# Patient Record
Sex: FEMALE | Race: ASIAN | NOT HISPANIC OR LATINO
[De-identification: names, ages, dates, MRNs, and addresses within clinical notes are randomized per-mention and may not be internally consistent; named-entity substitution may affect disease eponyms.]

---

## 2023-07-01 ENCOUNTER — APPOINTMENT (OUTPATIENT)
Dept: ORTHOPEDIC SURGERY | Facility: CLINIC | Age: 73
End: 2023-07-01
Payer: MEDICARE

## 2023-07-01 VITALS — BODY MASS INDEX: 22.78 KG/M2 | HEIGHT: 60 IN | WEIGHT: 116 LBS

## 2023-07-01 DIAGNOSIS — I10 ESSENTIAL (PRIMARY) HYPERTENSION: ICD-10-CM

## 2023-07-01 PROBLEM — Z00.00 ENCOUNTER FOR PREVENTIVE HEALTH EXAMINATION: Status: ACTIVE | Noted: 2023-07-01

## 2023-07-01 PROCEDURE — 99203 OFFICE O/P NEW LOW 30 MIN: CPT | Mod: 25

## 2023-07-01 PROCEDURE — J3490M: CUSTOM | Mod: NC

## 2023-07-01 PROCEDURE — 73562 X-RAY EXAM OF KNEE 3: CPT | Mod: RT

## 2023-07-01 PROCEDURE — 20610 DRAIN/INJ JOINT/BURSA W/O US: CPT | Mod: RT

## 2023-07-01 NOTE — PROCEDURE
[FreeTextEntry3] : Injection Procedure Note:\par  \par Patient Identification\par Name/: Verbal with patient and/or family\par  \par Procedure Verification:\par Procedure confirmed with patient or family/designee\par Consent for procedure: Verbal Consent Given\par Relevant documentation completed, reviewed, and signed\par Clinical indications for procedure confirmed\par \par  \par \par Time-out with all members of procedure team immediately prior to procedure:\par Correct patient identified. Agreement on procedure. Correct side and site.\par \par  \par \par KNEE INJECTION (STEROID) - RIGHT\par After verbal consent and identification of the correct patient and correct site, the superolateral right knee was prepped using alcohol swabs and betadine. This was allowed time to air dry. A mixture of 1cc DepoMedrol 40mg/ml, 3cc Lidocaine 1%, and 3cc Bupivacaine 0.5% was injected into the suprapatellar pouch using a sterile 22G needle after ethyl chloride spray for skin anesthesia. The patient tolerated the procedure well. After-care instructions were provided and included instructions to ice the area and to call if redness, pain, or fever develop.\par

## 2023-07-01 NOTE — ASSESSMENT
[FreeTextEntry1] : ASp/CSI tolerated well\par Ice rest nsaids\par FU 1-2 weeks after vacation with [place]

## 2023-07-01 NOTE — HISTORY OF PRESENT ILLNESS
[6] : 6 [Dull/Aching] : dull/aching [Radiating] : radiating [Constant] : constant [Standing] : standing [Walking] : walking [Stairs] : stairs [Retired] : Work status: retired [de-identified] : R knee pain x 1 week. No specific injury. She cares for her grandkids during the week. She has swelling. No prior R knee issues.  [] : Post Surgical Visit: no [FreeTextEntry1] : RT knee [FreeTextEntry5] : Patient is here with knee pain since 3 weeks ago, pain sometimes travels up the leg to the buttock, had a CSI in her Rt foot for her bunion 1 month ago

## 2023-07-01 NOTE — IMAGING
[de-identified] : \par RIGHT KNEE\par Inspection:  mild effusion\par Palpation: medial joint line tenderness, anterior tenderness\par Knee Range of Motion:  3-125 \par Strength: 5/5 Quadriceps strength, 5/5 Hamstring strength\par Neurological: light touch is intact throughout\par Ligament Stability and Special Tests: \par McMurrays: neg\par Lachman: neg\par Pivot Shift: neg\par Posterior Drawer: neg\par Valgus: neg\par Varus: neg\par Patella Apprehension: neg\par Patella Maltracking: neg\par \par  [AP] : anteroposterior [Right] : right knee [Lateral] : lateral [[place]] : [place] [Degenerative change] : Degenerative change [FreeTextEntry9] : enchondroma R femur

## 2023-07-20 ENCOUNTER — APPOINTMENT (OUTPATIENT)
Dept: ORTHOPEDIC SURGERY | Facility: CLINIC | Age: 73
End: 2023-07-20
Payer: MEDICARE

## 2023-07-20 VITALS — HEIGHT: 60 IN | WEIGHT: 118 LBS | BODY MASS INDEX: 23.16 KG/M2

## 2023-07-20 PROCEDURE — 99214 OFFICE O/P EST MOD 30 MIN: CPT

## 2023-07-20 RX ORDER — MELOXICAM 15 MG/1
15 TABLET ORAL
Qty: 30 | Refills: 2 | Status: ACTIVE | COMMUNITY
Start: 2023-07-20 | End: 1900-01-01

## 2023-07-20 NOTE — IMAGING
[de-identified] : Constitutional: well developed and well nourished, able to communicate\par Cardiovascular: Peripheral vascular exam is grossly normal\par Neurologic: Alert and oriented, no acute distress.\par Skin: normal skin with no ulcers, rashes, or lesions\par Pulmonary: No respiratory distress, breathing comfortably on room air\par Lymphatics: No obvious lymphadenopathy or lymphedema in areas examined\par \par \par RIGHT KNEE EXAM\par Alignment: Neutral \par Effusion: pos\par Atrophy: None                                                 \par Stable to Varus/valgus stress\par Posterior Drawer Test: negative\par Anterior Drawer Test: Negative\par Knee Extension/Flexion: 0 / 120\par \par Medial/lateral compartments\par Medial joint line: No Tenderness\par Lateral joint line: No Tenderness\par Charles test: negative\par \par Patellofemoral joint\par Medial patellar facet: no tenderness\par Patellar grind: pos\par \par Tendons:\par Pes Anserine: No tenderness\par Gerdy’s Tubercle/ IT Band: No tenderness\par Quadriceps Tendon: No Tenderness\par patellar tendon: no Tenderness\par Tibial tubercle: not tenderness\par Calf: no Tenderness\par \par Neurovascular exam\par Muscle strength: 5/5\par Sensation to light touch: intact\par Distal pulses: 2+\par \par IMAGING:\par 07/20/2023 Xrays of the Right Knee were taken demonstrating moderate tricompartmental OA, severe PF OA\par

## 2023-07-20 NOTE — ASSESSMENT
[FreeTextEntry1] : 73 y/o F with R knee OA , minimal relief with CSI 2 weeks prior.\par will hold off gel injection for 2 weeks prior to gel injections\par RTO in 2 weeks - aspiration and orthovisc if no relief.

## 2023-07-20 NOTE — HISTORY OF PRESENT ILLNESS
[6] : 6 [Dull/Aching] : dull/aching [Radiating] : radiating [Constant] : constant [Standing] : standing [Walking] : walking [Stairs] : stairs [Retired] : Work status: retired [de-identified] : 07/20/2023 [facility] 72 year female that comes in today with a chief complaint of R knee pain x 2 months prior. Had CSI/asp at  with VIJAY Esqueda. small amount of relief with CSI [] : Post Surgical Visit: no [FreeTextEntry1] : RT knee

## 2023-08-03 ENCOUNTER — APPOINTMENT (OUTPATIENT)
Dept: ORTHOPEDIC SURGERY | Facility: CLINIC | Age: 73
End: 2023-08-03
Payer: MEDICARE

## 2023-08-03 VITALS — HEIGHT: 60 IN | BODY MASS INDEX: 23.16 KG/M2 | WEIGHT: 118 LBS

## 2023-08-03 PROCEDURE — 99214 OFFICE O/P EST MOD 30 MIN: CPT | Mod: 25

## 2023-08-03 PROCEDURE — 20610 DRAIN/INJ JOINT/BURSA W/O US: CPT | Mod: RT

## 2023-08-03 NOTE — ASSESSMENT
[FreeTextEntry1] : 73 y/o F with R knee OA , minimal relief with CSI 2 weeks prior. will hold off gel injection for 2 weeks prior to gel injections RTO in 2 weeks - aspiration and orthovisc if no relief.  8/3/23: Asp 10 cc and orthovisc injection #1 Follow up in one week for inj #2

## 2023-08-03 NOTE — PROCEDURE
[FreeTextEntry3] : The risks, benefits and alternatives to the injection were discussed with the patient. The decision was made to proceed with the injection to reduce inflammation within the area. Verbal consent was obtained for the procedure. The right knee was cleaned with alcohol and ethyl chloride was sprayed topically. Ultrasound was used to identify the suprapatellar bursa and visualized the location of the needle. Orthovisc 1 of 4 was injected. The patient tolerated the procedure well and was instructed to ice the affected joint as needed later today. Activities can be performed as tolerated.  Asp 10 cc straw colored fluid

## 2023-08-03 NOTE — HISTORY OF PRESENT ILLNESS
[Dull/Aching] : dull/aching [Radiating] : radiating [Constant] : constant [Leisure] : leisure [Sleep] : sleep [Meds] : meds [Heat] : heat [Sitting] : sitting [Walking] : walking [Stairs] : stairs [Lying in bed] : lying in bed [6] : 6 [Standing] : standing [Retired] : Work status: retired [de-identified] : 07/20/2023 [facility] is a 72 year female that comes in today with a chief complaint of R knee pain x 2 months prior. Had CSI/asp at  with VIJAY Esqueda. small amount of relief with CSI  08/03/2023 follow up/ right knee. Having some discomfort but states it has improved a bit.  [] : Post Surgical Visit: no [FreeTextEntry1] : RT knee [de-identified] : 07/20/23 [de-identified] : X-Ray [de-identified] : Patient uses heating pad, topical cream and Tylenol daily.

## 2023-08-03 NOTE — IMAGING
[de-identified] : Constitutional: well developed and well nourished, able to communicate\par  Cardiovascular: Peripheral vascular exam is grossly normal\par  Neurologic: Alert and oriented, no acute distress.\par  Skin: normal skin with no ulcers, rashes, or lesions\par  Pulmonary: No respiratory distress, breathing comfortably on room air\par  Lymphatics: No obvious lymphadenopathy or lymphedema in areas examined\par  \par  \par  RIGHT KNEE EXAM\par  Alignment: Neutral \par  Effusion: pos\par  Atrophy: None                                                 \par  Stable to Varus/valgus stress\par  Posterior Drawer Test: negative\par  Anterior Drawer Test: Negative\par  Knee Extension/Flexion: 0 / 120\par  \par  Medial/lateral compartments\par  Medial joint line: No Tenderness\par  Lateral joint line: No Tenderness\par  Charles test: negative\par  \par  Patellofemoral joint\par  Medial patellar facet: no tenderness\par  Patellar grind: pos\par  \par  Tendons:\par  Pes Anserine: No tenderness\par  Gerdy's Tubercle/ IT Band: No tenderness\par  Quadriceps Tendon: No Tenderness\par  patellar tendon: no Tenderness\par  Tibial tubercle: not tenderness\par  Calf: no Tenderness\par  \par  Neurovascular exam\par  Muscle strength: 5/5\par  Sensation to light touch: intact\par  Distal pulses: 2+\par  \par  IMAGING:\par  07/20/2023 Xrays of the Right Knee were taken demonstrating moderate tricompartmental OA, severe PF OA\par

## 2023-08-10 ENCOUNTER — APPOINTMENT (OUTPATIENT)
Dept: ORTHOPEDIC SURGERY | Facility: CLINIC | Age: 73
End: 2023-08-10
Payer: MEDICARE

## 2023-08-10 VITALS — HEIGHT: 60 IN | BODY MASS INDEX: 23.16 KG/M2 | WEIGHT: 118 LBS

## 2023-08-10 PROCEDURE — 99213 OFFICE O/P EST LOW 20 MIN: CPT | Mod: 25

## 2023-08-10 PROCEDURE — 20610 DRAIN/INJ JOINT/BURSA W/O US: CPT | Mod: RT

## 2023-08-10 RX ORDER — METHYLPREDNISOLONE 4 MG/1
4 TABLET ORAL
Qty: 1 | Refills: 0 | Status: ACTIVE | COMMUNITY
Start: 2023-08-10 | End: 1900-01-01

## 2023-08-10 NOTE — REVIEW OF SYSTEMS
[Joint Pain] : joint pain [Negative] : Heme/Lymph Clear bilaterally, pupils equal, round and reactive to light.

## 2023-08-10 NOTE — PROCEDURE
[FreeTextEntry3] : The risks, benefits and alternatives to the injection were discussed with the patient. The decision was made to proceed with the injection to reduce inflammation within the area. Verbal consent was obtained for the procedure. The right knee was cleaned with alcohol and ethyl chloride was sprayed topically. Ultrasound was used to identify the suprapatellar bursa and visualized the location of the needle. Orthovisc 2 of 4 was injected. The patient tolerated the procedure well and was instructed to ice the affected joint as needed later today. Activities can be performed as tolerated.  Asp 10 cc straw colored fluid

## 2023-08-10 NOTE — IMAGING
[de-identified] : Constitutional: well developed and well nourished, able to communicate\par  Cardiovascular: Peripheral vascular exam is grossly normal\par  Neurologic: Alert and oriented, no acute distress.\par  Skin: normal skin with no ulcers, rashes, or lesions\par  Pulmonary: No respiratory distress, breathing comfortably on room air\par  Lymphatics: No obvious lymphadenopathy or lymphedema in areas examined\par  \par  \par  RIGHT KNEE EXAM\par  Alignment: Neutral \par  Effusion: pos\par  Atrophy: None                                                 \par  Stable to Varus/valgus stress\par  Posterior Drawer Test: negative\par  Anterior Drawer Test: Negative\par  Knee Extension/Flexion: 0 / 120\par  \par  Medial/lateral compartments\par  Medial joint line: No Tenderness\par  Lateral joint line: No Tenderness\par  Charles test: negative\par  \par  Patellofemoral joint\par  Medial patellar facet: no tenderness\par  Patellar grind: pos\par  \par  Tendons:\par  Pes Anserine: No tenderness\par  Gerdy's Tubercle/ IT Band: No tenderness\par  Quadriceps Tendon: No Tenderness\par  patellar tendon: no Tenderness\par  Tibial tubercle: not tenderness\par  Calf: no Tenderness\par  \par  Neurovascular exam\par  Muscle strength: 5/5\par  Sensation to light touch: intact\par  Distal pulses: 2+\par  \par  IMAGING:\par  07/20/2023 Xrays of the Right Knee were taken demonstrating moderate tricompartmental OA, severe PF OA\par

## 2023-08-10 NOTE — HISTORY OF PRESENT ILLNESS
[6] : 6 [Dull/Aching] : dull/aching [Radiating] : radiating [Constant] : constant [Leisure] : leisure [Sleep] : sleep [Meds] : meds [Heat] : heat [Sitting] : sitting [Standing] : standing [Walking] : walking [Stairs] : stairs [Lying in bed] : lying in bed [Retired] : Work status: retired [2] : 2 [Orthovisc] : Orthovisc [] : Post Surgical Visit: no [FreeTextEntry1] : RT knee [de-identified] : 07/20/23 [de-identified] : X-Ray [de-identified] : Patient uses heating pad, topical cream and Tylenol daily. [de-identified] : 08/03/23 [de-identified] : Right Knee [de-identified] : Pain increased and slight swelling occured [TWNoteComboBox1] : 0%

## 2023-08-10 NOTE — ASSESSMENT
[FreeTextEntry1] : 71 y/o F with R knee OA , minimal relief with CSI 2 weeks prior. will hold off gel injection for 2 weeks prior to gel injections RTO in 2 weeks - aspiration and orthovisc if no relief.  8/3/23: Asp 10 cc and orthovisc injection #1 Follow up in one week for inj #2  8/10/23: Orthovisc inj #2 MDP  Follow up in one week for inj # 3

## 2023-08-17 ENCOUNTER — APPOINTMENT (OUTPATIENT)
Dept: ORTHOPEDIC SURGERY | Facility: CLINIC | Age: 73
End: 2023-08-17
Payer: MEDICARE

## 2023-08-17 VITALS — HEIGHT: 60 IN | BODY MASS INDEX: 23.16 KG/M2 | WEIGHT: 118 LBS

## 2023-08-17 PROCEDURE — 99213 OFFICE O/P EST LOW 20 MIN: CPT | Mod: 25

## 2023-08-17 PROCEDURE — 20610 DRAIN/INJ JOINT/BURSA W/O US: CPT | Mod: RT

## 2023-08-17 NOTE — HISTORY OF PRESENT ILLNESS
[6] : 6 [Dull/Aching] : dull/aching [Radiating] : radiating [Constant] : constant [Leisure] : leisure [Sleep] : sleep [Meds] : meds [Heat] : heat [Sitting] : sitting [Standing] : standing [Walking] : walking [Stairs] : stairs [Lying in bed] : lying in bed [Retired] : Work status: retired [3] : 3 [Orthovisc] : Orthovisc [] : Post Surgical Visit: no [FreeTextEntry1] : RT knee [de-identified] : 07/20/23 [de-identified] : X-Ray [de-identified] : Patient uses heating pad, topical cream and Tylenol daily. [de-identified] : 08/10/23 [de-identified] : Right Knee [de-identified] : Pain slightly decreased and slight swelling occured [TWNoteComboBox1] : 50%

## 2023-08-17 NOTE — PROCEDURE
[FreeTextEntry3] : The risks, benefits and alternatives to the injection were discussed with the patient. The decision was made to proceed with the injection to reduce inflammation within the area. Verbal consent was obtained for the procedure. The right knee was cleaned with alcohol and ethyl chloride was sprayed topically. Ultrasound was used to identify the suprapatellar bursa and visualized the location of the needle. Orthovisc 3 of 4 was injected. The patient tolerated the procedure well and was instructed to ice the affected joint as needed later today. Activities can be performed as tolerated.  Asp 10 cc straw colored fluid

## 2023-08-17 NOTE — ASSESSMENT
[FreeTextEntry1] : 71 y/o F with R knee OA , minimal relief with CSI 2 weeks prior. will hold off gel injection for 2 weeks prior to gel injections RTO in 2 weeks - aspiration and orthovisc if no relief.  8/3/23: Asp 10 cc and orthovisc injection #1 Follow up in one week for inj #2  8/10/23: Orthovisc inj #2 MDP  Follow up in one week for inj # 3  8/17/23: Orthovisc inj #3 Follow up in one week for inj #4

## 2023-08-17 NOTE — IMAGING
[de-identified] : Constitutional: well developed and well nourished, able to communicate\par  Cardiovascular: Peripheral vascular exam is grossly normal\par  Neurologic: Alert and oriented, no acute distress.\par  Skin: normal skin with no ulcers, rashes, or lesions\par  Pulmonary: No respiratory distress, breathing comfortably on room air\par  Lymphatics: No obvious lymphadenopathy or lymphedema in areas examined\par  \par  \par  RIGHT KNEE EXAM\par  Alignment: Neutral \par  Effusion: pos\par  Atrophy: None                                                 \par  Stable to Varus/valgus stress\par  Posterior Drawer Test: negative\par  Anterior Drawer Test: Negative\par  Knee Extension/Flexion: 0 / 120\par  \par  Medial/lateral compartments\par  Medial joint line: No Tenderness\par  Lateral joint line: No Tenderness\par  Charles test: negative\par  \par  Patellofemoral joint\par  Medial patellar facet: no tenderness\par  Patellar grind: pos\par  \par  Tendons:\par  Pes Anserine: No tenderness\par  Gerdy's Tubercle/ IT Band: No tenderness\par  Quadriceps Tendon: No Tenderness\par  patellar tendon: no Tenderness\par  Tibial tubercle: not tenderness\par  Calf: no Tenderness\par  \par  Neurovascular exam\par  Muscle strength: 5/5\par  Sensation to light touch: intact\par  Distal pulses: 2+\par  \par  IMAGING:\par  07/20/2023 Xrays of the Right Knee were taken demonstrating moderate tricompartmental OA, severe PF OA\par

## 2023-08-24 ENCOUNTER — APPOINTMENT (OUTPATIENT)
Dept: ORTHOPEDIC SURGERY | Facility: CLINIC | Age: 73
End: 2023-08-24
Payer: MEDICARE

## 2023-08-24 VITALS — WEIGHT: 118 LBS | BODY MASS INDEX: 23.16 KG/M2 | HEIGHT: 60 IN

## 2023-08-24 DIAGNOSIS — M17.11 UNILATERAL PRIMARY OSTEOARTHRITIS, RIGHT KNEE: ICD-10-CM

## 2023-08-24 PROCEDURE — 20610 DRAIN/INJ JOINT/BURSA W/O US: CPT | Mod: RT

## 2023-08-24 PROCEDURE — 99213 OFFICE O/P EST LOW 20 MIN: CPT | Mod: 25

## 2023-08-24 RX ORDER — DICLOFENAC SODIUM 1% 10 MG/G
1 GEL TOPICAL
Qty: 1 | Refills: 1 | Status: ACTIVE | COMMUNITY
Start: 2023-08-24 | End: 1900-01-01

## 2023-08-24 NOTE — PROCEDURE
[FreeTextEntry3] : The risks, benefits and alternatives to the injection were discussed with the patient. The decision was made to proceed with the injection to reduce inflammation within the area. Verbal consent was obtained for the procedure. The right knee was cleaned with alcohol and ethyl chloride was sprayed topically. Ultrasound was used to identify the suprapatellar bursa and visualized the location of the needle. Orthovisc 4 of 4 was injected. The patient tolerated the procedure well and was instructed to ice the affected joint as needed later today. Activities can be performed as tolerated.  Asp 10 cc straw colored fluid

## 2023-08-24 NOTE — HISTORY OF PRESENT ILLNESS
[6] : 6 [Dull/Aching] : dull/aching [Radiating] : radiating [Constant] : constant [Leisure] : leisure [Sleep] : sleep [Meds] : meds [Heat] : heat [Sitting] : sitting [Standing] : standing [Walking] : walking [Stairs] : stairs [Lying in bed] : lying in bed [Retired] : Work status: retired [4] : 4 [Orthovisc] : Orthovisc [] : Post Surgical Visit: no [FreeTextEntry1] : RT knee [de-identified] : 07/20/23 [de-identified] : X-Ray [de-identified] : Patient uses heating pad, topical cream and Tylenol daily. [de-identified] : 08/17/23 [de-identified] : Right Knee [de-identified] : Pain slightly decreased and slight swelling occured [TWNoteComboBox1] : 50%

## 2023-08-24 NOTE — ASSESSMENT
[FreeTextEntry1] : 71 y/o F with R knee OA , minimal relief with CSI 2 weeks prior. will hold off gel injection for 2 weeks prior to gel injections RTO in 2 weeks - aspiration and orthovisc if no relief.  8/3/23: Asp 10 cc and orthovisc injection #1 Follow up in one week for inj #2  8/10/23: Orthovisc inj #2 MDP  Follow up in one week for inj # 3  8/17/23: Orthovisc inj #3 Follow up in one week for inj #4  8/24/23: Orthovisc inj #4 Follow up PRN

## 2023-08-24 NOTE — IMAGING
[de-identified] : Constitutional: well developed and well nourished, able to communicate\par  Cardiovascular: Peripheral vascular exam is grossly normal\par  Neurologic: Alert and oriented, no acute distress.\par  Skin: normal skin with no ulcers, rashes, or lesions\par  Pulmonary: No respiratory distress, breathing comfortably on room air\par  Lymphatics: No obvious lymphadenopathy or lymphedema in areas examined\par  \par  \par  RIGHT KNEE EXAM\par  Alignment: Neutral \par  Effusion: pos\par  Atrophy: None                                                 \par  Stable to Varus/valgus stress\par  Posterior Drawer Test: negative\par  Anterior Drawer Test: Negative\par  Knee Extension/Flexion: 0 / 120\par  \par  Medial/lateral compartments\par  Medial joint line: No Tenderness\par  Lateral joint line: No Tenderness\par  Charles test: negative\par  \par  Patellofemoral joint\par  Medial patellar facet: no tenderness\par  Patellar grind: pos\par  \par  Tendons:\par  Pes Anserine: No tenderness\par  Gerdy's Tubercle/ IT Band: No tenderness\par  Quadriceps Tendon: No Tenderness\par  patellar tendon: no Tenderness\par  Tibial tubercle: not tenderness\par  Calf: no Tenderness\par  \par  Neurovascular exam\par  Muscle strength: 5/5\par  Sensation to light touch: intact\par  Distal pulses: 2+\par  \par  IMAGING:\par  07/20/2023 Xrays of the Right Knee were taken demonstrating moderate tricompartmental OA, severe PF OA\par